# Patient Record
Sex: MALE | Race: WHITE | NOT HISPANIC OR LATINO
[De-identification: names, ages, dates, MRNs, and addresses within clinical notes are randomized per-mention and may not be internally consistent; named-entity substitution may affect disease eponyms.]

---

## 2020-09-11 PROBLEM — Z00.00 ENCOUNTER FOR PREVENTIVE HEALTH EXAMINATION: Status: ACTIVE | Noted: 2020-09-11

## 2020-09-15 ENCOUNTER — APPOINTMENT (OUTPATIENT)
Dept: ORTHOPEDIC SURGERY | Facility: CLINIC | Age: 40
End: 2020-09-15
Payer: MEDICAID

## 2020-09-15 VITALS — HEIGHT: 70 IN | WEIGHT: 232 LBS | BODY MASS INDEX: 33.21 KG/M2

## 2020-09-15 DIAGNOSIS — Z86.39 PERSONAL HISTORY OF OTHER ENDOCRINE, NUTRITIONAL AND METABOLIC DISEASE: ICD-10-CM

## 2020-09-15 PROCEDURE — 99204 OFFICE O/P NEW MOD 45 MIN: CPT

## 2020-09-24 ENCOUNTER — APPOINTMENT (OUTPATIENT)
Dept: MRI IMAGING | Facility: CLINIC | Age: 40
End: 2020-09-24

## 2020-09-24 ENCOUNTER — RESULT REVIEW (OUTPATIENT)
Age: 40
End: 2020-09-24

## 2020-09-29 ENCOUNTER — APPOINTMENT (OUTPATIENT)
Dept: ORTHOPEDIC SURGERY | Facility: CLINIC | Age: 40
End: 2020-09-29
Payer: MEDICAID

## 2020-09-29 DIAGNOSIS — M25.311 OTHER INSTABILITY, RIGHT SHOULDER: ICD-10-CM

## 2020-09-29 PROCEDURE — 99212 OFFICE O/P EST SF 10 MIN: CPT

## 2020-09-29 RX ORDER — ATORVASTATIN CALCIUM 80 MG/1
TABLET, FILM COATED ORAL
Refills: 0 | Status: ACTIVE | COMMUNITY

## 2020-10-30 ENCOUNTER — APPOINTMENT (OUTPATIENT)
Dept: ORTHOPEDIC SURGERY | Facility: CLINIC | Age: 40
End: 2020-10-30

## 2024-03-06 ENCOUNTER — APPOINTMENT (OUTPATIENT)
Dept: BARIATRICS | Facility: CLINIC | Age: 44
End: 2024-03-06
Payer: MEDICAID

## 2024-03-06 VITALS
SYSTOLIC BLOOD PRESSURE: 136 MMHG | WEIGHT: 252.44 LBS | HEIGHT: 70 IN | TEMPERATURE: 97.2 F | HEART RATE: 77 BPM | OXYGEN SATURATION: 95 % | DIASTOLIC BLOOD PRESSURE: 82 MMHG | BODY MASS INDEX: 36.14 KG/M2

## 2024-03-06 DIAGNOSIS — E78.1 PURE HYPERGLYCERIDEMIA: ICD-10-CM

## 2024-03-06 DIAGNOSIS — G47.33 OBSTRUCTIVE SLEEP APNEA (ADULT) (PEDIATRIC): ICD-10-CM

## 2024-03-06 DIAGNOSIS — E78.9 DISORDER OF LIPOPROTEIN METABOLISM, UNSPECIFIED: ICD-10-CM

## 2024-03-06 DIAGNOSIS — E78.00 PURE HYPERCHOLESTEROLEMIA, UNSPECIFIED: ICD-10-CM

## 2024-03-06 DIAGNOSIS — G47.30 SLEEP APNEA, UNSPECIFIED: ICD-10-CM

## 2024-03-06 DIAGNOSIS — Z82.49 FAMILY HISTORY OF ISCHEMIC HEART DISEASE AND OTHER DISEASES OF THE CIRCULATORY SYSTEM: ICD-10-CM

## 2024-03-06 DIAGNOSIS — K62.5 HEMORRHAGE OF ANUS AND RECTUM: ICD-10-CM

## 2024-03-06 DIAGNOSIS — I10 ESSENTIAL (PRIMARY) HYPERTENSION: ICD-10-CM

## 2024-03-06 DIAGNOSIS — Z80.0 FAMILY HISTORY OF MALIGNANT NEOPLASM OF DIGESTIVE ORGANS: ICD-10-CM

## 2024-03-06 PROCEDURE — 99204 OFFICE O/P NEW MOD 45 MIN: CPT

## 2024-03-06 RX ORDER — FENOFIBRATE 145 MG/1
145 TABLET, COATED ORAL
Refills: 0 | Status: ACTIVE | COMMUNITY

## 2024-03-06 RX ORDER — SERTRALINE HYDROCHLORIDE 100 MG/1
100 TABLET, FILM COATED ORAL
Refills: 0 | Status: ACTIVE | COMMUNITY

## 2024-03-06 NOTE — HISTORY OF PRESENT ILLNESS
[de-identified] : Artemio Schmidt is a 42 y/o M with sleep apnea, chronic cough, HLD, probable insulin resistance. Educated and has been unable to lose weight with multiple attempts and frustrated by this. On lipitor and currently with a BMI 44. Discussed in great detail that he needs to be active after surgery and eat healthy. He has a supportive family as well. On anti anxiolytics with no psych contraindications. Will take Pepcid QHS due to nighttime cough. Get EGD and labs for probable VSG unless considerable pathology on EGD.

## 2024-03-06 NOTE — ASSESSMENT
[FreeTextEntry1] : Artemio Schmidt is a 44 y/o M with sleep apnea, chronic cough, HLD, probable insulin resistance. Educated and has been unable to lose weight with multiple attempts and frustrated by this. On lipitor and currently with a BMI 44. Discussed in great detail that he needs to be active after surgery and eat healthy. He has a supportive family as well. On anti anxiolytics with no psych contraindications. Will take Pepcid QHS due to nighttime cough. Get EGD and labs for probable VSG unless considerable pathology on EGD.

## 2024-03-06 NOTE — ADDENDUM
[FreeTextEntry1] :  Documented by Toya Gillette acting as a scribe for ADAM Adams  on 03/06/2024  .

## 2024-03-06 NOTE — PLAN
[FreeTextEntry1] :  Will take Pepcid QHS due to nighttime cough. Get EGD and labs for probable VSG unless considerable pathology on EGD.

## 2024-03-06 NOTE — END OF VISIT
[Time Spent: ___ minutes] : I have spent [unfilled] minutes of time on the encounter. [FreeTextEntry3] :  All medical record entries made by the Scribe were at my, ADAM Aragon , direction and personally dictated by me on 03/06/2024 . I have reviewed the chart and agree that the record accurately reflects my personal performance of the history, physical exam, assessment and plan. I have also personally directed, reviewed, and agreed with the chart.

## 2024-03-06 NOTE — REVIEW OF SYSTEMS
I called mom and left a message to call back to schedule procedure with Dr. Bravo.    [Negative] : Allergic/Immunologic

## 2024-03-11 ENCOUNTER — APPOINTMENT (OUTPATIENT)
Dept: BARIATRICS | Facility: CLINIC | Age: 44
End: 2024-03-11

## 2024-03-12 ENCOUNTER — NON-APPOINTMENT (OUTPATIENT)
Age: 44
End: 2024-03-12

## 2024-03-12 VITALS — BODY MASS INDEX: 36.65 KG/M2 | WEIGHT: 256 LBS | HEIGHT: 70 IN

## 2024-04-10 ENCOUNTER — NON-APPOINTMENT (OUTPATIENT)
Age: 44
End: 2024-04-10

## 2024-04-10 VITALS — HEIGHT: 70 IN | BODY MASS INDEX: 36.36 KG/M2 | WEIGHT: 254 LBS

## 2024-06-26 ENCOUNTER — APPOINTMENT (OUTPATIENT)
Dept: BARIATRICS | Facility: CLINIC | Age: 44
End: 2024-06-26
Payer: MEDICAID

## 2024-06-26 VITALS — BODY MASS INDEX: 36.79 KG/M2 | HEIGHT: 70 IN | WEIGHT: 257 LBS

## 2024-06-26 DIAGNOSIS — E66.01 MORBID (SEVERE) OBESITY DUE TO EXCESS CALORIES: ICD-10-CM

## 2024-06-26 PROCEDURE — 99214 OFFICE O/P EST MOD 30 MIN: CPT

## 2024-07-02 ENCOUNTER — APPOINTMENT (OUTPATIENT)
Dept: BARIATRICS | Facility: CLINIC | Age: 44
End: 2024-07-02

## 2024-07-03 ENCOUNTER — APPOINTMENT (OUTPATIENT)
Dept: BARIATRICS | Facility: CLINIC | Age: 44
End: 2024-07-03
Payer: MEDICAID

## 2024-07-03 DIAGNOSIS — E66.01 MORBID (SEVERE) OBESITY DUE TO EXCESS CALORIES: ICD-10-CM

## 2024-07-03 PROCEDURE — 99213 OFFICE O/P EST LOW 20 MIN: CPT

## 2024-07-05 NOTE — PATIENT PROFILE ADULT - STATED REASON FOR ADMISSION
From: Manuel Han  To: Tyler Payan  Sent: 11/22/2021 1:58 PM CST  Subject: Refills     Hi     I know I see Young Randall on a few weeks but my meds will probably run out beforebi see her. The pharmacy called to say that refills for Jardiance and Winsome Orn were not approved? I also put a request in for Fiasp and trulicity as well. Let me knkw what you need from me.  Sara to meet her Dec 20 Laparoscopic sleeve

## 2024-07-05 NOTE — PATIENT PROFILE ADULT - FALL HARM RISK - RISK INTERVENTIONS

## 2024-07-07 ENCOUNTER — TRANSCRIPTION ENCOUNTER (OUTPATIENT)
Age: 44
End: 2024-07-07

## 2024-07-08 ENCOUNTER — INPATIENT (INPATIENT)
Facility: HOSPITAL | Age: 44
LOS: 0 days | Discharge: HOME | DRG: 621 | End: 2024-07-09
Attending: SURGERY | Admitting: SURGERY
Payer: MEDICAID

## 2024-07-08 ENCOUNTER — APPOINTMENT (OUTPATIENT)
Dept: BARIATRICS | Facility: HOSPITAL | Age: 44
End: 2024-07-08

## 2024-07-08 ENCOUNTER — RESULT REVIEW (OUTPATIENT)
Age: 44
End: 2024-07-08

## 2024-07-08 VITALS
OXYGEN SATURATION: 95 % | TEMPERATURE: 98 F | RESPIRATION RATE: 16 BRPM | SYSTOLIC BLOOD PRESSURE: 132 MMHG | DIASTOLIC BLOOD PRESSURE: 91 MMHG | WEIGHT: 255.96 LBS | HEIGHT: 70 IN | HEART RATE: 84 BPM

## 2024-07-08 DIAGNOSIS — Z98.890 OTHER SPECIFIED POSTPROCEDURAL STATES: Chronic | ICD-10-CM

## 2024-07-08 LAB
HCT VFR BLD CALC: 45.6 % — SIGNIFICANT CHANGE UP (ref 39–50)
HGB BLD-MCNC: 15.7 G/DL — SIGNIFICANT CHANGE UP (ref 13–17)
MCHC RBC-ENTMCNC: 30.1 PG — SIGNIFICANT CHANGE UP (ref 27–34)
MCHC RBC-ENTMCNC: 34.4 GM/DL — SIGNIFICANT CHANGE UP (ref 32–36)
MCV RBC AUTO: 87.5 FL — SIGNIFICANT CHANGE UP (ref 80–100)
NRBC # BLD: 0 /100 WBCS — SIGNIFICANT CHANGE UP (ref 0–0)
PLATELET # BLD AUTO: 234 K/UL — SIGNIFICANT CHANGE UP (ref 150–400)
RBC # BLD: 5.21 M/UL — SIGNIFICANT CHANGE UP (ref 4.2–5.8)
RBC # FLD: 11.9 % — SIGNIFICANT CHANGE UP (ref 10.3–14.5)
WBC # BLD: 10.98 K/UL — HIGH (ref 3.8–10.5)
WBC # FLD AUTO: 10.98 K/UL — HIGH (ref 3.8–10.5)

## 2024-07-08 PROCEDURE — 88307 TISSUE EXAM BY PATHOLOGIST: CPT | Mod: 26

## 2024-07-08 PROCEDURE — 43775 LAP SLEEVE GASTRECTOMY: CPT

## 2024-07-08 PROCEDURE — 43332 TRANSAB ESOPH HIAT HERN RPR: CPT

## 2024-07-08 DEVICE — STAPLER ETHICON GST ECHELON 60MM GREEN RELOAD: Type: IMPLANTABLE DEVICE | Status: FUNCTIONAL

## 2024-07-08 DEVICE — STAPLER ETHICON ECHELON ENDOPATH 60MM: Type: IMPLANTABLE DEVICE | Status: FUNCTIONAL

## 2024-07-08 DEVICE — STAPLER ETHICON GST ECHELON 60MM BLUE RELOAD: Type: IMPLANTABLE DEVICE | Status: FUNCTIONAL

## 2024-07-08 DEVICE — STAPLER ETHICON ECHELON ENDOPATH GRIP SURFACE 60MM WHITE RELOAD: Type: IMPLANTABLE DEVICE | Status: FUNCTIONAL

## 2024-07-08 RX ORDER — THIAMINE HCL 100 MG
500 TABLET ORAL DAILY
Refills: 0 | Status: DISCONTINUED | OUTPATIENT
Start: 2024-07-08 | End: 2024-07-09

## 2024-07-08 RX ORDER — ATORVASTATIN CALCIUM 20 MG/1
1 TABLET, FILM COATED ORAL
Refills: 0 | DISCHARGE

## 2024-07-08 RX ORDER — KETOROLAC TROMETHAMINE 30 MG/ML
15 INJECTION, SOLUTION INTRAMUSCULAR EVERY 6 HOURS
Refills: 0 | Status: DISCONTINUED | OUTPATIENT
Start: 2024-07-08 | End: 2024-07-09

## 2024-07-08 RX ORDER — FENOFIBRATE 130 MG/1
1 CAPSULE ORAL
Refills: 0 | DISCHARGE

## 2024-07-08 RX ORDER — ACETAMINOPHEN 325 MG
1000 TABLET ORAL ONCE
Refills: 0 | Status: COMPLETED | OUTPATIENT
Start: 2024-07-08 | End: 2024-07-08

## 2024-07-08 RX ORDER — DEXTROSE MONOHYDRATE AND SODIUM CHLORIDE 5; .3 G/100ML; G/100ML
1000 INJECTION, SOLUTION INTRAVENOUS
Refills: 0 | Status: DISCONTINUED | OUTPATIENT
Start: 2024-07-08 | End: 2024-07-09

## 2024-07-08 RX ORDER — ENOXAPARIN SODIUM 100 MG/ML
30 INJECTION SUBCUTANEOUS ONCE
Refills: 0 | Status: COMPLETED | OUTPATIENT
Start: 2024-07-08 | End: 2024-07-08

## 2024-07-08 RX ORDER — ONDANSETRON HYDROCHLORIDE 2 MG/ML
4 INJECTION INTRAMUSCULAR; INTRAVENOUS EVERY 6 HOURS
Refills: 0 | Status: DISCONTINUED | OUTPATIENT
Start: 2024-07-08 | End: 2024-07-09

## 2024-07-08 RX ORDER — PROPRANOLOL HCL 80 MG
1 TABLET ORAL
Refills: 0 | DISCHARGE

## 2024-07-08 RX ORDER — ACETAMINOPHEN 325 MG
1000 TABLET ORAL EVERY 6 HOURS
Refills: 0 | Status: DISCONTINUED | OUTPATIENT
Start: 2024-07-08 | End: 2024-07-08

## 2024-07-08 RX ORDER — ACETAMINOPHEN 325 MG
1000 TABLET ORAL ONCE
Refills: 0 | Status: DISCONTINUED | OUTPATIENT
Start: 2024-07-08 | End: 2024-07-09

## 2024-07-08 RX ORDER — SCOPOLAMINE 1.5 MG/1
1 PATCH, EXTENDED RELEASE TRANSDERMAL ONCE
Refills: 0 | Status: COMPLETED | OUTPATIENT
Start: 2024-07-08 | End: 2024-07-08

## 2024-07-08 RX ORDER — HYDROMORPHONE HCL 0.2 MG/ML
0.5 INJECTION, SOLUTION INTRAVENOUS
Refills: 0 | Status: DISCONTINUED | OUTPATIENT
Start: 2024-07-08 | End: 2024-07-08

## 2024-07-08 RX ORDER — PANTOPRAZOLE SODIUM 40 MG/10ML
40 INJECTION, POWDER, FOR SOLUTION INTRAVENOUS DAILY
Refills: 0 | Status: DISCONTINUED | OUTPATIENT
Start: 2024-07-08 | End: 2024-07-09

## 2024-07-08 RX ADMIN — Medication 105 MILLIGRAM(S): at 11:29

## 2024-07-08 RX ADMIN — DEXTROSE MONOHYDRATE AND SODIUM CHLORIDE 150 MILLILITER(S): 5; .3 INJECTION, SOLUTION INTRAVENOUS at 10:38

## 2024-07-08 RX ADMIN — ENOXAPARIN SODIUM 30 MILLIGRAM(S): 100 INJECTION SUBCUTANEOUS at 07:09

## 2024-07-08 RX ADMIN — PANTOPRAZOLE SODIUM 40 MILLIGRAM(S): 40 INJECTION, POWDER, FOR SOLUTION INTRAVENOUS at 12:12

## 2024-07-08 RX ADMIN — SCOPOLAMINE 1 PATCH: 1.5 PATCH, EXTENDED RELEASE TRANSDERMAL at 07:09

## 2024-07-08 RX ADMIN — Medication 400 MILLIGRAM(S): at 14:20

## 2024-07-08 RX ADMIN — Medication 1000 MILLIGRAM(S): at 07:10

## 2024-07-08 RX ADMIN — KETOROLAC TROMETHAMINE 15 MILLIGRAM(S): 30 INJECTION, SOLUTION INTRAMUSCULAR at 22:29

## 2024-07-08 NOTE — PROGRESS NOTE ADULT - SUBJECTIVE AND OBJECTIVE BOX
Procedure: s/p laparoscopic sleeve gastrectomy with HHR   Surgeon: Dr. Gongora    S: Pt seen and examined at bedside. Patient is lying comfortably in bed with no complaints. Tolerating diet, pain well controlled with current regimen. Patient denies fever, nausea, vomiting, chest pain, and shortness of breath. Patient is not yet passing gas or having bowel movements. Patient has not voided yet.     O:  T(C): 36.3 (07-08-24 @ 12:32), Max: 36.4 (07-08-24 @ 10:14)  T(F): 97.3 (07-08-24 @ 12:32), Max: 97.5 (07-08-24 @ 10:14)  HR: 90 (07-08-24 @ 12:32) (87 - 95)  BP: 121/71 (07-08-24 @ 12:32) (110/68 - 125/69)  RR: 20 (07-08-24 @ 12:32) (17 - 25)  SpO2: 96% (07-08-24 @ 12:32) (88% - 96%)  Wt(kg): --            Physical Exam  General: Patient is doing well and lying in bed comfortably  Constitutional: alert and oriented   Pulm: Nonlabored breathing, no respiratory distress  CV: Regular rate and rhythm, normal sinus rhythm  Abd:  soft, TTP in LUQ and in epigastric area. Mildly distended. No rebound or guarding.             Incisions: 4 port sites with dermabond, dry, intact and healing well, no erythema/induration/edema   Extremities: warm, well perfused, no edema  Drains:     A/P: 43y Male s/p   Diet/IVF:  Pain/nausea control prn  DVT ppx:  Dispo plan:   Procedure: s/p laparoscopic sleeve gastrectomy with HHR   Surgeon: Dr. Gongora    S: Pt seen and examined at bedside. Patient is lying comfortably in bed with no complaints. Tolerating diet, pain well controlled with current regimen. Patient denies fever, nausea, vomiting, chest pain, and shortness of breath. Patient is not yet passing gas or having bowel movements. Patient has not voided yet.     O:  T(C): 36.3 (07-08-24 @ 12:32), Max: 36.4 (07-08-24 @ 10:14)  T(F): 97.3 (07-08-24 @ 12:32), Max: 97.5 (07-08-24 @ 10:14)  HR: 90 (07-08-24 @ 12:32) (87 - 95)  BP: 121/71 (07-08-24 @ 12:32) (110/68 - 125/69)  RR: 20 (07-08-24 @ 12:32) (17 - 25)  SpO2: 96% (07-08-24 @ 12:32) (88% - 96%)  Wt(kg): --            Physical Exam  General: Patient is doing well and lying in bed comfortably  Constitutional: alert and oriented   Pulm: Nonlabored breathing, no respiratory distress  CV: Regular rate and rhythm, normal sinus rhythm  Abd:  soft, TTP in LUQ and in epigastric area. Mildly distended. No rebound or guarding.             Incisions: 4 port sites with dermabond, clean, dry, intact and healing well, no erythema/induration/edema   Extremities: warm, well perfused, no edema    A/P: 43y Male s/p laparoscopic sleeve gastrectomy with HHR  BCLD  Pain/nausea control prn  CBC at 4pm  SCDs/OOBA/IS  Protonix  Nutrition consult in AM  AM Labs Procedure: s/p laparoscopic sleeve gastrectomy with HHR   Surgeon: Dr. Gongora    S: Pt seen and examined at bedside. Patient is lying comfortably in bed with no complaints. Tolerating diet, pain well controlled with current regimen. Patient denies fever, nausea, vomiting, chest pain, and shortness of breath. Patient is not yet passing gas or having bowel movements. Patient has not voided yet.     O:  T(C): 36.3 (07-08-24 @ 12:32), Max: 36.4 (07-08-24 @ 10:14)  T(F): 97.3 (07-08-24 @ 12:32), Max: 97.5 (07-08-24 @ 10:14)  HR: 90 (07-08-24 @ 12:32) (87 - 95)  BP: 121/71 (07-08-24 @ 12:32) (110/68 - 125/69)  RR: 20 (07-08-24 @ 12:32) (17 - 25)  SpO2: 96% (07-08-24 @ 12:32) (88% - 96%)  Wt(kg): --            Physical Exam  General: Patient is doing well and lying in bed comfortably  Constitutional: alert and oriented   Pulm: Nonlabored breathing, no respiratory distress  CV: Regular rate and rhythm, normal sinus rhythm  Abd:  soft, TTP in LUQ and in epigastric area, Mildly distended. No rebound or guarding.             Incisions: 4 port sites with dermabond, clean, dry, intact and healing well, no erythema/induration/edema   Extremities: warm, well perfused, no edema     Procedure: s/p laparoscopic sleeve gastrectomy with HHR   Surgeon: Dr. Gongora    S: Pt seen and examined at bedside. Patient is lying comfortably in bed with no complaints. Tolerating diet, pain well controlled with current regimen. Patient denies fever, nausea, vomiting, chest pain, and shortness of breath. Patient is not yet passing gas or having bowel movements. Patient has not voided yet.     O:  T(C): 36.3 (07-08-24 @ 12:32), Max: 36.4 (07-08-24 @ 10:14)  T(F): 97.3 (07-08-24 @ 12:32), Max: 97.5 (07-08-24 @ 10:14)  HR: 90 (07-08-24 @ 12:32) (87 - 95)  BP: 121/71 (07-08-24 @ 12:32) (110/68 - 125/69)  RR: 20 (07-08-24 @ 12:32) (17 - 25)  SpO2: 96% (07-08-24 @ 12:32) (88% - 96%)  Wt(kg): --            Physical Exam  General: Patient is doing well and lying in bed comfortably  Constitutional: alert and oriented   Pulm: Nonlabored breathing, no respiratory distress  CV: Regular rate and rhythm, normal sinus rhythm  Abd:  soft, TTP in LUQ and in epigastric area, Mildly distended. No rebound or guarding.             Incisions: 5 port sites with dermabond, clean, dry, intact and healing well, no erythema/induration/edema   Extremities: warm, well perfused, no edema

## 2024-07-08 NOTE — H&P ADULT - NSICDXPASTMEDICALHX_GEN_ALL_CORE_FT
PAST MEDICAL HISTORY:  Anxiety     Dyslipidemia     GERD (gastroesophageal reflux disease)     GUILLERMO (obstructive sleep apnea) occasional CPAP

## 2024-07-08 NOTE — BRIEF OPERATIVE NOTE - OPERATION/FINDINGS
Optiview entry at in LUQ, mid clavicular with 12mm trocar, additional 15mm trocar placed under direct visualization at umbilicus, 2 additional 5mm ports placed LUQ, RUQ, and liver retractor placed subxiphoid. Liver retracted superiorly, Ligasure used to enter lesser sac at level of angularis, epiploic branches divided, and omentum  from greater curvature. Stomach divided along 38Fr Bougie edge using laparoscopic stapler, green load with buttress x 1, blue load with buttress x 5, white load x 1. Small hiatal hernia noted and and right and left vanesa approximated with Quill, with larger bites on left. Hemostasis achieved. Specimen removed via 15mm port site, fascia then closed w 0-vicryl, skin closed w 4-0 monocryl and dermabond.

## 2024-07-08 NOTE — PROGRESS NOTE ADULT - ASSESSMENT
A/P: The patient is a 43 year old male with a PMHx of HLD, anxiety, GUILLERMO (not on CPAP at home), MO (BMI 36 from 44) now s/p laparoscopic sleeve gastrectomy with HHR on 7/8.     Plan  BCLD  Pain/nausea control prn  CBC at 4pm  SCDs/OOBA/IS  Protonix  Nutrition consult in AM  AM Labs

## 2024-07-08 NOTE — H&P ADULT - ASSESSMENT
The patient is a 43 year old male with a PMHx of HLD, anxiety, GUILLERMO (not on CPAP at home), MO presenting for laparoscopic sleeve gastrectomy and possible hiatal hernia repair.     Plan  Proceed to OR

## 2024-07-08 NOTE — H&P ADULT - HISTORY OF PRESENT ILLNESS
The patient is a 44 y/o M with sleep apnea, chronic cough, HLD, probable insulin resistance, MO w/ BMI 44 presenting for sleeve gastrectomy and possible hiatal hernia repair.   Preoperative workup complete, with Hgb 15.9, Cr 0.9.

## 2024-07-08 NOTE — H&P ADULT - NSHPPHYSICALEXAM_GEN_ALL_CORE
General: Patient is doing well and lying in bed comfortably  Constitutional: alert and oriented   Pulm: Nonlabored breathing, no respiratory distress  CV: Regular rate and rhythm, normal sinus rhythm  Abd:  soft, nontender, nondistended. No rebound, no guarding.   Extremities: warm, well perfused, no edema

## 2024-07-09 ENCOUNTER — APPOINTMENT (OUTPATIENT)
Dept: BARIATRICS | Facility: CLINIC | Age: 44
End: 2024-07-09

## 2024-07-09 ENCOUNTER — TRANSCRIPTION ENCOUNTER (OUTPATIENT)
Age: 44
End: 2024-07-09

## 2024-07-09 VITALS
DIASTOLIC BLOOD PRESSURE: 94 MMHG | RESPIRATION RATE: 20 BRPM | HEART RATE: 98 BPM | SYSTOLIC BLOOD PRESSURE: 154 MMHG | OXYGEN SATURATION: 94 % | TEMPERATURE: 98 F

## 2024-07-09 LAB
ANION GAP SERPL CALC-SCNC: 12 MMOL/L — SIGNIFICANT CHANGE UP (ref 5–17)
BUN SERPL-MCNC: 17 MG/DL — SIGNIFICANT CHANGE UP (ref 7–23)
CALCIUM SERPL-MCNC: 9.6 MG/DL — SIGNIFICANT CHANGE UP (ref 8.4–10.5)
CHLORIDE SERPL-SCNC: 104 MMOL/L — SIGNIFICANT CHANGE UP (ref 96–108)
CO2 SERPL-SCNC: 22 MMOL/L — SIGNIFICANT CHANGE UP (ref 22–31)
CREAT SERPL-MCNC: 0.96 MG/DL — SIGNIFICANT CHANGE UP (ref 0.5–1.3)
EGFR: 100 ML/MIN/1.73M2 — SIGNIFICANT CHANGE UP
GLUCOSE SERPL-MCNC: 126 MG/DL — HIGH (ref 70–99)
HCT VFR BLD CALC: 47 % — SIGNIFICANT CHANGE UP (ref 39–50)
HGB BLD-MCNC: 15.8 G/DL — SIGNIFICANT CHANGE UP (ref 13–17)
MAGNESIUM SERPL-MCNC: 2.3 MG/DL — SIGNIFICANT CHANGE UP (ref 1.6–2.6)
MCHC RBC-ENTMCNC: 29.7 PG — SIGNIFICANT CHANGE UP (ref 27–34)
MCHC RBC-ENTMCNC: 33.6 GM/DL — SIGNIFICANT CHANGE UP (ref 32–36)
MCV RBC AUTO: 88.3 FL — SIGNIFICANT CHANGE UP (ref 80–100)
NRBC # BLD: 0 /100 WBCS — SIGNIFICANT CHANGE UP (ref 0–0)
PHOSPHATE SERPL-MCNC: 2.7 MG/DL — SIGNIFICANT CHANGE UP (ref 2.5–4.5)
PLATELET # BLD AUTO: 282 K/UL — SIGNIFICANT CHANGE UP (ref 150–400)
POTASSIUM SERPL-MCNC: 4.2 MMOL/L — SIGNIFICANT CHANGE UP (ref 3.5–5.3)
POTASSIUM SERPL-SCNC: 4.2 MMOL/L — SIGNIFICANT CHANGE UP (ref 3.5–5.3)
RBC # BLD: 5.32 M/UL — SIGNIFICANT CHANGE UP (ref 4.2–5.8)
RBC # FLD: 11.9 % — SIGNIFICANT CHANGE UP (ref 10.3–14.5)
SODIUM SERPL-SCNC: 138 MMOL/L — SIGNIFICANT CHANGE UP (ref 135–145)
WBC # BLD: 12.31 K/UL — HIGH (ref 3.8–10.5)
WBC # FLD AUTO: 12.31 K/UL — HIGH (ref 3.8–10.5)

## 2024-07-09 PROCEDURE — 85027 COMPLETE CBC AUTOMATED: CPT

## 2024-07-09 PROCEDURE — C1889: CPT

## 2024-07-09 PROCEDURE — 80048 BASIC METABOLIC PNL TOTAL CA: CPT

## 2024-07-09 PROCEDURE — 83735 ASSAY OF MAGNESIUM: CPT

## 2024-07-09 PROCEDURE — 36415 COLL VENOUS BLD VENIPUNCTURE: CPT

## 2024-07-09 PROCEDURE — 94660 CPAP INITIATION&MGMT: CPT

## 2024-07-09 PROCEDURE — 84100 ASSAY OF PHOSPHORUS: CPT

## 2024-07-09 RX ORDER — ACETAMINOPHEN 325 MG
1000 TABLET ORAL ONCE
Refills: 0 | Status: COMPLETED | OUTPATIENT
Start: 2024-07-09 | End: 2024-07-09

## 2024-07-09 RX ORDER — OMEPRAZOLE 10 MG/1
1 CAPSULE, DELAYED RELEASE ORAL
Qty: 30 | Refills: 0
Start: 2024-07-09 | End: 2024-08-07

## 2024-07-09 RX ORDER — ACETAMINOPHEN 325 MG
20 TABLET ORAL
Qty: 320 | Refills: 0
Start: 2024-07-09 | End: 2024-07-12

## 2024-07-09 RX ORDER — OMEPRAZOLE 10 MG/1
1 CAPSULE, DELAYED RELEASE ORAL
Refills: 0 | DISCHARGE

## 2024-07-09 RX ORDER — DEXTROSE MONOHYDRATE AND SODIUM CHLORIDE 5; .3 G/100ML; G/100ML
1000 INJECTION, SOLUTION INTRAVENOUS
Refills: 0 | Status: DISCONTINUED | OUTPATIENT
Start: 2024-07-09 | End: 2024-07-09

## 2024-07-09 RX ORDER — APIXABAN 5 MG/1
1 TABLET, FILM COATED ORAL
Qty: 60 | Refills: 0
Start: 2024-07-09 | End: 2024-08-07

## 2024-07-09 RX ORDER — SERTRALINE HYDROCHLORIDE 100 MG/1
0 TABLET, FILM COATED ORAL
Qty: 0 | Refills: 0 | DISCHARGE

## 2024-07-09 RX ADMIN — KETOROLAC TROMETHAMINE 15 MILLIGRAM(S): 30 INJECTION, SOLUTION INTRAMUSCULAR at 10:51

## 2024-07-09 RX ADMIN — KETOROLAC TROMETHAMINE 15 MILLIGRAM(S): 30 INJECTION, SOLUTION INTRAMUSCULAR at 10:36

## 2024-07-09 RX ADMIN — Medication 1000 MILLIGRAM(S): at 10:09

## 2024-07-09 RX ADMIN — PANTOPRAZOLE SODIUM 40 MILLIGRAM(S): 40 INJECTION, POWDER, FOR SOLUTION INTRAVENOUS at 09:54

## 2024-07-09 RX ADMIN — DEXTROSE MONOHYDRATE AND SODIUM CHLORIDE 75 MILLILITER(S): 5; .3 INJECTION, SOLUTION INTRAVENOUS at 10:01

## 2024-07-09 RX ADMIN — Medication 400 MILLIGRAM(S): at 09:54

## 2024-07-09 RX ADMIN — Medication 105 MILLIGRAM(S): at 10:50

## 2024-07-09 RX ADMIN — Medication 62.5 MILLIMOLE(S): at 10:44

## 2024-07-09 RX ADMIN — SCOPOLAMINE 1 PATCH: 1.5 PATCH, EXTENDED RELEASE TRANSDERMAL at 07:30

## 2024-07-09 RX ADMIN — KETOROLAC TROMETHAMINE 15 MILLIGRAM(S): 30 INJECTION, SOLUTION INTRAMUSCULAR at 04:34

## 2024-07-09 NOTE — DIETITIAN INITIAL EVALUATION ADULT - REASON FOR ADMISSION
G47.30  "The patient is a 43 year old male with a PMHx of HLD, anxiety, GUILLERMO (not on CPAP at home), MO (BMI 36 from 44) now s/p laparoscopic sleeve gastrectomy with HHR on 7/8."

## 2024-07-09 NOTE — DIETITIAN INITIAL EVALUATION ADULT - PERTINENT MEDS FT
MEDICATIONS  (STANDING):  ketorolac   Injectable 15 milliGRAM(s) IV Push every 6 hours  lactated ringers. 1000 milliLiter(s) (75 mL/Hr) IV Continuous <Continuous>  pantoprazole  Injectable 40 milliGRAM(s) IV Push daily  thiamine IVPB 500 milliGRAM(s) IV Intermittent daily    MEDICATIONS  (PRN):  ondansetron Injectable 4 milliGRAM(s) IV Push every 6 hours PRN Nausea

## 2024-07-09 NOTE — DIETITIAN INITIAL EVALUATION ADULT - PERTINENT LABORATORY DATA
07-09    138  |  104  |  17  ----------------------------<  126<H>  4.2   |  22  |  0.96    Ca    9.6      09 Jul 2024 05:30  Phos  2.7     07-09  Mg     2.3     07-09

## 2024-07-09 NOTE — DIETITIAN INITIAL EVALUATION ADULT - EDUCATION DIETARY MODIFICATIONS
RD Discussed volumes of various cup sizes on tray table and encouraged aiming for 4 oz/hr as tolerated. Pt is prepared with protein shakes, with plan to get vitamins after discharge. RD provided indepth edu on diet advancement and specific nutrient needs s/p LSG. Pt appears receptive, verbalized understanding. Written nutrition education handouts provided./(2) meets goals/outcomes/verbalization

## 2024-07-09 NOTE — DIETITIAN INITIAL EVALUATION ADULT - OTHER INFO
Pt seen on 9WO at bedside. On assessment, pt resting in bed. Currently on BARICLLIQ diet, tolerating PO. Pt had sips of water out of the clear, 30cc cups. Pain and nausea well controlled. Discussed volumes of various cup sizes on tray table and encouraged aiming for 4 oz/hr as tolerated. Prepared with protein shakes, with plan to get vitamins after discharge. RD provided indepth edu on diet advancement and specific nutrient needs s/p LSG. Confirms No known food allergies. States that he follows a Kosher diet at home. Skin: surgical incisions. Narinder Score: 19 GI: WDL per flowsheet. Labs reviewed:WNL ; will monitor trends. Pt's wt on admission was 255 pounds , pt's IBW is 166 pounds ; IBW to be utilized for nutrient calculations. RD observed pt with no overt signs of muscle or fat wasting. Based on ASPEN guidelines, pt does not meet criteria for malnutrition at this time. RD to follow up per protocol.

## 2024-07-09 NOTE — DISCHARGE NOTE PROVIDER - HOSPITAL COURSE
43 year old male with past medical history hyperlipidemia,  anxiety, GUILLERMO (not on CPAP at home), obesity (BMI 36),  now s/p laparoscopic sleeve gastrectomy with hiatal hernia repair on 7/8. Pt tolerated the procedure well. At time of discharge, pt was tolerating a bariatric clear liquid diet, and pt's pain was controlled. Plan is to follow up with Dr. Gongora in the office.

## 2024-07-09 NOTE — DISCHARGE NOTE PROVIDER - NSDCFUADDINST_GEN_ALL_CORE_FT
Follow up with Dr. Gongora in 1 week. Call the office at  to schedule your appointment. You may shower; soap and water over incision sites. Do not scrub. Pat dry when done. No tub bathing or swimming until cleared. Keep incision sites out of the sun as scars will darken. No heavy lifting (>10lbs) or strenuous exercise. Diet: Bariatric Full Fluids. 60 grams protein daily.  64 fluid ounces water daily. Drink small sips throughout the day. Continue diet as outlined by paperwork received as a pre-operative patient. You should be urinating at least 3-4x per day. Call the office if you experience increasing abdominal pain, nausea, vomiting, or temperature >100.4F.  NO ASPIRIN OR NSAIDs until approved by Dr. Gongora. Avoid alcoholic beverages until cleared by Dr. Gongora.  1) Please take Tylenol 650 mg every 4 to 6 hours by mouth for moderate pain control. Please do not exceed over 4,000 mg of Tylenol a day.  2) Please start taking Eliquis 2.5 mg by mouth twice a day starting 3 days after surgery. Please start Thursday July 11, 2024 .  3) Please take Omeprazole 40 mg once a day by mouth.

## 2024-07-09 NOTE — DISCHARGE NOTE NURSING/CASE MANAGEMENT/SOCIAL WORK - PATIENT PORTAL LINK FT
You can access the FollowMyHealth Patient Portal offered by [facility] by registering at the following website: [email or web address]. By joining [email or web address]’s FollowMyHealth portal, you will also be able to view your health information using other applications (apps) compatible with our system.

## 2024-07-09 NOTE — DISCHARGE NOTE PROVIDER - NSDCMRMEDTOKEN_GEN_ALL_CORE_FT
acetaminophen 160 mg/5 mL oral liquid: 20 milliliter(s) orally every 6 hours as needed for -for moderate to severe pain MDD: 80 mL  Eliquis 2.5 mg oral tablet: 1 tab(s) orally 2 times a day MDD:2 tablets MDD: 2 tabs  fenofibrate 145 mg oral tablet: 1 tab(s) orally  Lipitor 20 mg oral tablet: 1 tab(s) orally  omeprazole 40 mg oral delayed release capsule: 1 cap(s) orally once a day MDD: 1 cap  propranolol 40 mg oral tablet: 1 tab(s) orally  zoloft: 1 tab by mouth daily

## 2024-07-09 NOTE — PROGRESS NOTE ADULT - ASSESSMENT
The patient is a 43 year old male with a PMHx of HLD, anxiety, GUILLERMO (not on CPAP at home), MO (BMI 36 from 44) now s/p laparoscopic sleeve gastrectomy with HHR on 7/8.     BCLD  Pain/nausea control prn  CBC at 4pm  SCDs/OOBA/IS  Protonix  Nutrition consult in AM  AM Labs     The patient is a 43 year old male with a PMHx of HLD, anxiety, GUILLERMO (not on CPAP at home), MO (BMI 36 from 44) now s/p laparoscopic sleeve gastrectomy with HHR on 7/8.     BCLD  Pain/nausea control prn  CBC at 4pm  SCDs/OOBA/IS  Protonix  Nutrition consult in AM  AM Labs    Patient seen and case discussed in detail with Dr. Gongora

## 2024-07-09 NOTE — DISCHARGE NOTE PROVIDER - NSDCFUSCHEDAPPT_GEN_ALL_CORE_FT
[place] Gongora  [facility]  [facility] [address]  Scheduled Appointment: 07/24/2024    [facility]  [facility] [address]  Scheduled Appointment: 09/13/2024

## 2024-07-09 NOTE — PROGRESS NOTE ADULT - SUBJECTIVE AND OBJECTIVE BOX
INTERVAL HPI/OVERNIGHT EVENTS:  Failed TOV, BS with 500cc, straight cath put out 1L, next TOV at 6AM  7/8: s/p sleeve, HHR, on HFNC, gave 1000 mg IV tylenol     STATUS POST:  Laparoscopic revision of sleeve gastrectomy, modified duodenal switch and hiatal hernia repair     POST OPERATIVE DAY #: 1    SUBJECTIVE: Patient examined bedside with Dr. Gongora.  Patient complains of incisional pain. Patient reports that she is tolerating sips of bariatric clear liquid diet. Patient reports she is ambulating and using incentive spirometer. Patient denies SOB, chest pain, nausea and emesis . Patient reports he urinated this morning before surgical team arrived to his room.           Vital Signs Last 24 Hrs  T(C): 36.5 (09 Jul 2024 04:44), Max: 36.9 (08 Jul 2024 13:07)  T(F): 97.7 (09 Jul 2024 04:44), Max: 98.5 (08 Jul 2024 13:07)  HR: 103 (09 Jul 2024 06:44) (82 - 103)  BP: 142/81 (09 Jul 2024 04:44) (110/68 - 152/83)  BP(mean): 91 (08 Jul 2024 12:32) (84 - 94)  RR: 18 (09 Jul 2024 06:44) (17 - 25)  SpO2: 96% (09 Jul 2024 06:44) (88% - 99%)    Parameters below as of 09 Jul 2024 06:44  Patient On (Oxygen Delivery Method): room air      I&O's Detail    08 Jul 2024 07:01  -  09 Jul 2024 07:00  --------------------------------------------------------  IN:    Lactated Ringers: 3150 mL    Oral Fluid: 30 mL  Total IN: 3180 mL    OUT:    Intermittent Catheterization - Urethral (mL): 1000 mL    Voided (mL): 275 mL  Total OUT: 1275 mL    Total NET: 1905 mL          General: NAD, resting comfortably in bed  C/V: NSR  Pulm: Nonlabored breathing, no respiratory distress  Abd: soft, non distended , TTP around incision site , incision clean dry and intact   Extrem: WWP, no edema, SCDs in place        LABS:                        15.8   12.31 )-----------( 282      ( 09 Jul 2024 05:30 )             47.0     07-09    138  |  104  |  17  ----------------------------<  126<H>  4.2   |  22  |  0.96    Ca    9.6      09 Jul 2024 05:30  Phos  2.7     07-09  Mg     2.3     07-09        Urinalysis Basic - ( 09 Jul 2024 05:30 )    Color: x / Appearance: x / SG: x / pH: x  Gluc: 126 mg/dL / Ketone: x  / Bili: x / Urobili: x   Blood: x / Protein: x / Nitrite: x   Leuk Esterase: x / RBC: x / WBC x   Sq Epi: x / Non Sq Epi: x / Bacteria: x           INTERVAL HPI/OVERNIGHT EVENTS:  Failed TOV, BS with 500cc, straight cath put out 1L, next TOV at 6AM  7/8: s/p sleeve, HHR, on HFNC, gave 1000 mg IV tylenol     STATUS POST:  Laparoscopic  sleeve gastrectomy and hiatal hernia repair     POST OPERATIVE DAY #: 1    SUBJECTIVE: Patient examined bedside with Dr. Gongora.  Patient complains of incisional pain. Patient reports that he is tolerating sips of bariatric clear liquid diet. Patient reports he is ambulating and using incentive spirometer. Patient denies SOB, chest pain, nausea and emesis . Patient reports he urinated this morning before surgical team arrived to his room.           Vital Signs Last 24 Hrs  T(C): 36.5 (09 Jul 2024 04:44), Max: 36.9 (08 Jul 2024 13:07)  T(F): 97.7 (09 Jul 2024 04:44), Max: 98.5 (08 Jul 2024 13:07)  HR: 103 (09 Jul 2024 06:44) (82 - 103)  BP: 142/81 (09 Jul 2024 04:44) (110/68 - 152/83)  BP(mean): 91 (08 Jul 2024 12:32) (84 - 94)  RR: 18 (09 Jul 2024 06:44) (17 - 25)  SpO2: 96% (09 Jul 2024 06:44) (88% - 99%)    Parameters below as of 09 Jul 2024 06:44  Patient On (Oxygen Delivery Method): room air      I&O's Detail    08 Jul 2024 07:01  -  09 Jul 2024 07:00  --------------------------------------------------------  IN:    Lactated Ringers: 3150 mL    Oral Fluid: 30 mL  Total IN: 3180 mL    OUT:    Intermittent Catheterization - Urethral (mL): 1000 mL    Voided (mL): 275 mL  Total OUT: 1275 mL    Total NET: 1905 mL          General: NAD, resting comfortably in bed  C/V: NSR  Pulm: Nonlabored breathing, no respiratory distress  Abd: soft, non distended , TTP around incision site , incision clean dry and intact   Extrem: WWP, no edema, SCDs in place        LABS:                        15.8   12.31 )-----------( 282      ( 09 Jul 2024 05:30 )             47.0     07-09    138  |  104  |  17  ----------------------------<  126<H>  4.2   |  22  |  0.96    Ca    9.6      09 Jul 2024 05:30  Phos  2.7     07-09  Mg     2.3     07-09        Urinalysis Basic - ( 09 Jul 2024 05:30 )    Color: x / Appearance: x / SG: x / pH: x  Gluc: 126 mg/dL / Ketone: x  / Bili: x / Urobili: x   Blood: x / Protein: x / Nitrite: x   Leuk Esterase: x / RBC: x / WBC x   Sq Epi: x / Non Sq Epi: x / Bacteria: x

## 2024-07-09 NOTE — DIETITIAN INITIAL EVALUATION ADULT - OTHER CALCULATIONS
Above energy needs calculated for wt maintenance (20-25kcal/kg).   Weeks 1-2 estimated needs: kcal/day (10-12kcal/kg), g pro/day (1.2-1.15/kg), >/=64oz clear fluids.

## 2024-07-09 NOTE — DISCHARGE NOTE PROVIDER - CARE PROVIDER_API CALL
Washington Gongora  Surgery  [address]  [place] [address]  Phone: [phone]  Fax: [phone]  Scheduled Appointment: 07/24/2024

## 2024-07-09 NOTE — DISCHARGE NOTE PROVIDER - NSDCCPCAREPLAN_GEN_ALL_CORE_FT
PRINCIPAL DISCHARGE DIAGNOSIS  Diagnosis: Obese  Assessment and Plan of Treatment: 43 year old male with past medical history hyperlipidemia,  anxiety, GUILLERMO (not on CPAP at home), obesity (BMI 36),  now s/p laparoscopic sleeve gastrectomy with hiatal hernia repair on 7/8. Pt tolerated the procedure well. At time of discharge, pt was tolerating a bariatric clear liquid diet, and pt's pain was controlled. Plan is to follow up with Dr. Gongora in the office.  1) Please take Tylenol 650 mg every 4 to 6 hours by mouth for moderate pain control. Please do not exceed over 4,000 mg of Tylenol a day.  2) Please start taking Eliquis 2.5 mg by mouth twice a day starting 3 days after surgery. Please start Thursday July 11, 2024 .  3) Please take Omeprazole 40 mg once a day by mouth.

## 2024-07-10 ENCOUNTER — NON-APPOINTMENT (OUTPATIENT)
Age: 44
End: 2024-07-10

## 2024-07-10 LAB — SURGICAL PATHOLOGY STUDY: SIGNIFICANT CHANGE UP

## 2024-07-11 ENCOUNTER — NON-APPOINTMENT (OUTPATIENT)
Age: 44
End: 2024-07-11

## 2024-07-12 ENCOUNTER — NON-APPOINTMENT (OUTPATIENT)
Age: 44
End: 2024-07-12

## 2024-07-15 DIAGNOSIS — K21.9 GASTRO-ESOPHAGEAL REFLUX DISEASE WITHOUT ESOPHAGITIS: ICD-10-CM

## 2024-07-15 DIAGNOSIS — K76.0 FATTY (CHANGE OF) LIVER, NOT ELSEWHERE CLASSIFIED: ICD-10-CM

## 2024-07-15 DIAGNOSIS — E66.01 MORBID (SEVERE) OBESITY DUE TO EXCESS CALORIES: ICD-10-CM

## 2024-07-15 DIAGNOSIS — K44.9 DIAPHRAGMATIC HERNIA WITHOUT OBSTRUCTION OR GANGRENE: ICD-10-CM

## 2024-07-15 DIAGNOSIS — G47.33 OBSTRUCTIVE SLEEP APNEA (ADULT) (PEDIATRIC): ICD-10-CM

## 2024-07-15 DIAGNOSIS — E78.5 HYPERLIPIDEMIA, UNSPECIFIED: ICD-10-CM

## 2024-07-15 DIAGNOSIS — R16.0 HEPATOMEGALY, NOT ELSEWHERE CLASSIFIED: ICD-10-CM

## 2024-07-15 DIAGNOSIS — F41.9 ANXIETY DISORDER, UNSPECIFIED: ICD-10-CM

## 2024-07-16 ENCOUNTER — NON-APPOINTMENT (OUTPATIENT)
Age: 44
End: 2024-07-16

## 2024-07-16 ENCOUNTER — TRANSCRIPTION ENCOUNTER (OUTPATIENT)
Age: 44
End: 2024-07-16

## 2024-07-24 ENCOUNTER — APPOINTMENT (OUTPATIENT)
Dept: BARIATRICS | Facility: CLINIC | Age: 44
End: 2024-07-24
Payer: MEDICAID

## 2024-07-24 VITALS
SYSTOLIC BLOOD PRESSURE: 114 MMHG | TEMPERATURE: 97.6 F | HEART RATE: 87 BPM | BODY MASS INDEX: 33.93 KG/M2 | OXYGEN SATURATION: 97 % | WEIGHT: 237 LBS | DIASTOLIC BLOOD PRESSURE: 76 MMHG | HEIGHT: 70 IN

## 2024-07-24 DIAGNOSIS — I10 ESSENTIAL (PRIMARY) HYPERTENSION: ICD-10-CM

## 2024-07-24 DIAGNOSIS — Z98.84 BARIATRIC SURGERY STATUS: ICD-10-CM

## 2024-07-24 DIAGNOSIS — Z80.0 FAMILY HISTORY OF MALIGNANT NEOPLASM OF DIGESTIVE ORGANS: ICD-10-CM

## 2024-07-24 DIAGNOSIS — E66.01 MORBID (SEVERE) OBESITY DUE TO EXCESS CALORIES: ICD-10-CM

## 2024-07-24 DIAGNOSIS — Z82.49 FAMILY HISTORY OF ISCHEMIC HEART DISEASE AND OTHER DISEASES OF THE CIRCULATORY SYSTEM: ICD-10-CM

## 2024-07-24 DIAGNOSIS — E78.1 PURE HYPERGLYCERIDEMIA: ICD-10-CM

## 2024-07-24 DIAGNOSIS — E78.00 PURE HYPERCHOLESTEROLEMIA, UNSPECIFIED: ICD-10-CM

## 2024-07-24 PROCEDURE — 99024 POSTOP FOLLOW-UP VISIT: CPT

## 2024-07-25 PROBLEM — Z98.84 S/P LAPAROSCOPIC SLEEVE GASTRECTOMY: Status: ACTIVE | Noted: 2024-07-25

## 2024-07-25 NOTE — ADDENDUM
[FreeTextEntry1] :  Documented by Toya Gillette acting as a scribe for ADAM Adams  on 07/24/2024  .

## 2024-07-25 NOTE — END OF VISIT
[Time Spent: ___ minutes] : I have spent [unfilled] minutes of time on the encounter. [FreeTextEntry3] :  All medical record entries made by the Scribe were at my, ADAM Aragon , direction and personally dictated by me on 07/24/2024 . I have reviewed the chart and agree that the record accurately reflects my personal performance of the history, physical exam, assessment and plan. I have also personally directed, reviewed, and agreed with the chart.

## 2024-07-25 NOTE — PHYSICAL EXAM
[Obese] : obese [Obese, well nourished, in no acute distress] : obese, well nourished, in no acute distress [Normal] : normoactive bowel sounds, soft and nontender, no hepatosplenomegaly or masses appreciated. Incisions healing appropriately without erythema or drainage [de-identified] : 5 healing lap sites with dermabond, no signs of infection

## 2024-07-25 NOTE — HISTORY OF PRESENT ILLNESS
[de-identified] : Artemio Schmidt is a 42 y/o M 2 weeks s/p VSG on 07/08/2024 who presents today for post op care. He is doing really well with no complains and denies n/v/c/d/ SOB, acid reflux, po intolerance, chest pain, abd pain, dizziness, fever and calf pain. Tolerating stage 2 diet with adequate protein and fluid intake. Advanced diet to blended foods and soups. Compliant with vitamins and medications and denies any difficulty with bowel movements.

## 2024-07-25 NOTE — ASSESSMENT
[FreeTextEntry1] : Artemio Schmidt is 42 y/o M having a stable post op course 2 weeks s/p VSG. Tolerating stage 2 diet well and compliant with vitamins. Will follow up with dietician to advance diet as per guidelines and advised to do physical activity but refrain from heavy lifting for one month.

## 2024-07-25 NOTE — ASSESSMENT
[FreeTextEntry1] : Artemio Schmidt is 44 y/o M having a stable post op course 2 weeks s/p VSG. Tolerating stage 2 diet well and compliant with vitamins. Will follow up with dietician to advance diet as per guidelines and advised to do physical activity but refrain from heavy lifting for one month.

## 2024-07-25 NOTE — HISTORY OF PRESENT ILLNESS
[de-identified] : Artemio Schmidt is a 42 y/o M 2 weeks s/p VSG on 07/08/2024 who presents today for post op care. He is doing really well with no complains and denies n/v/c/d/ SOB, acid reflux, po intolerance, chest pain, abd pain, dizziness, fever and calf pain. Tolerating stage 2 diet with adequate protein and fluid intake. Advanced diet to blended foods and soups. Compliant with vitamins and medications and denies any difficulty with bowel movements.

## 2024-07-25 NOTE — PHYSICAL EXAM
[Obese] : obese [Obese, well nourished, in no acute distress] : obese, well nourished, in no acute distress [Normal] : normoactive bowel sounds, soft and nontender, no hepatosplenomegaly or masses appreciated. Incisions healing appropriately without erythema or drainage [de-identified] : 5 healing lap sites with dermabond, no signs of infection

## 2024-09-13 ENCOUNTER — APPOINTMENT (OUTPATIENT)
Dept: SURGERY | Facility: CLINIC | Age: 44
End: 2024-09-13
Payer: MEDICAID

## 2024-09-13 VITALS
HEART RATE: 71 BPM | BODY MASS INDEX: 31.92 KG/M2 | OXYGEN SATURATION: 98 % | SYSTOLIC BLOOD PRESSURE: 117 MMHG | TEMPERATURE: 97.1 F | WEIGHT: 223 LBS | HEIGHT: 70 IN | DIASTOLIC BLOOD PRESSURE: 74 MMHG

## 2024-09-13 DIAGNOSIS — Z98.84 BARIATRIC SURGERY STATUS: ICD-10-CM

## 2024-09-13 PROCEDURE — 99024 POSTOP FOLLOW-UP VISIT: CPT

## 2024-09-13 NOTE — ASSESSMENT
[FreeTextEntry1] : Pt is a 44 y/o M 2 mo s/p VSG with  who presents today feeling well here for post op care. Pt states he received an automated message for an appt today and states he traveled over an hr for the appt and he was comfortable seeing me. Pt reports doing great overall and denies any n,v,c,d, acid reflux, or abd pn. Pt states he is tolerating his diet and is focusing on consuming adequate daily protein and fluids. States he has fully progressed to solid foods without difficulty. Pt states he is taking his vit most days of the week and daily compliance was encouraged. Pt reports doing some walking and we discussed the importance of daily physical activity and pt plans to start increasing his exercise throughout the week. Recommended incorporating strength training as well into his regimen. Pt has lost 34 lbs total since sx which he is very happy with. No other concerns at this time.

## 2024-09-13 NOTE — HISTORY OF PRESENT ILLNESS
[de-identified] : Pt is a 44 y/o M 2 mo s/p VSG with  who presents today feeling well here for post op care. Pt states he received an automated message for an appt today and states he traveled over an hr for the appt and he was comfortable seeing me. Pt reports doing great overall and denies any n,v,c,d, acid reflux, or abd pn. Pt states he is tolerating his diet and is focusing on consuming adequate daily protein and fluids. States he has fully progressed to solid foods without difficulty. Pt states he is taking his vit most days of the week and daily compliance was encouraged. Pt reports doing some walking and we discussed the importance of daily physical activity and pt plans to start increasing his exercise throughout the week. Recommended incorporating strength training as well into his regimen. Pt has lost 34 lbs total since sx which he is very happy with. No other concerns at this time.

## 2025-01-08 ENCOUNTER — APPOINTMENT (OUTPATIENT)
Dept: BARIATRICS | Facility: CLINIC | Age: 45
End: 2025-01-08
Payer: MEDICAID

## 2025-01-08 VITALS
TEMPERATURE: 97.2 F | OXYGEN SATURATION: 99 % | WEIGHT: 204 LBS | BODY MASS INDEX: 29.2 KG/M2 | SYSTOLIC BLOOD PRESSURE: 125 MMHG | HEART RATE: 58 BPM | HEIGHT: 70 IN | DIASTOLIC BLOOD PRESSURE: 83 MMHG

## 2025-01-08 DIAGNOSIS — Z98.84 BARIATRIC SURGERY STATUS: ICD-10-CM

## 2025-01-08 DIAGNOSIS — E66.01 MORBID (SEVERE) OBESITY DUE TO EXCESS CALORIES: ICD-10-CM

## 2025-01-08 PROCEDURE — 99214 OFFICE O/P EST MOD 30 MIN: CPT

## 2025-01-17 ENCOUNTER — NON-APPOINTMENT (OUTPATIENT)
Age: 45
End: 2025-01-17

## 2025-01-17 LAB
25(OH)D3 SERPL-MCNC: 28.7 NG/ML
A-TOCOPHEROL VIT E SERPL-MCNC: 11.5 MG/L
ALBUMIN SERPL ELPH-MCNC: 4.6 G/DL
ALP BLD-CCNC: 60 U/L
ALT SERPL-CCNC: 15 U/L
ANION GAP SERPL CALC-SCNC: 11 MMOL/L
AST SERPL-CCNC: 13 U/L
BASOPHILS # BLD AUTO: 0.05 K/UL
BASOPHILS NFR BLD AUTO: 1 %
BETA+GAMMA TOCOPHEROL SERPL-MCNC: 1.4 MG/L
BILIRUB SERPL-MCNC: 0.5 MG/DL
BUN SERPL-MCNC: 15 MG/DL
CALCIUM SERPL-MCNC: 10.7 MG/DL
CALCIUM SERPL-MCNC: 10.7 MG/DL
CHLORIDE SERPL-SCNC: 101 MMOL/L
CHOLEST SERPL-MCNC: 225 MG/DL
CO2 SERPL-SCNC: 28 MMOL/L
CREAT SERPL-MCNC: 0.87 MG/DL
EGFR: 108 ML/MIN/1.73M2
EOSINOPHIL # BLD AUTO: 0.06 K/UL
EOSINOPHIL NFR BLD AUTO: 1.2 %
ESTIMATED AVERAGE GLUCOSE: 114 MG/DL
FERRITIN SERPL-MCNC: 140 NG/ML
FOLATE SERPL-MCNC: 15.9 NG/ML
GLUCOSE SERPL-MCNC: 94 MG/DL
HBA1C MFR BLD HPLC: 5.6 %
HCT VFR BLD CALC: 48 %
HDLC SERPL-MCNC: 47 MG/DL
HGB BLD-MCNC: 16.2 G/DL
IMM GRANULOCYTES NFR BLD AUTO: 0.2 %
IRON SATN MFR SERPL: 20 %
IRON SERPL-MCNC: 59 UG/DL
LDLC SERPL CALC-MCNC: 164 MG/DL
LYMPHOCYTES # BLD AUTO: 1.79 K/UL
LYMPHOCYTES NFR BLD AUTO: 34.8 %
MAN DIFF?: NORMAL
MCHC RBC-ENTMCNC: 29.7 PG
MCHC RBC-ENTMCNC: 33.8 G/DL
MCV RBC AUTO: 87.9 FL
MONOCYTES # BLD AUTO: 0.55 K/UL
MONOCYTES NFR BLD AUTO: 10.7 %
NEUTROPHILS # BLD AUTO: 2.68 K/UL
NEUTROPHILS NFR BLD AUTO: 52.1 %
NONHDLC SERPL-MCNC: 178 MG/DL
PARATHYROID HORMONE INTACT: 34 PG/ML
PLATELET # BLD AUTO: 216 K/UL
POTASSIUM SERPL-SCNC: 5 MMOL/L
PREALB SERPL NEPH-MCNC: 27 MG/DL
PROT SERPL-MCNC: 7.3 G/DL
RBC # BLD: 5.46 M/UL
RBC # FLD: 13.4 %
SODIUM SERPL-SCNC: 141 MMOL/L
TIBC SERPL-MCNC: 297 UG/DL
TRIGL SERPL-MCNC: 80 MG/DL
TSH SERPL-ACNC: 2.02 UIU/ML
UIBC SERPL-MCNC: 238 UG/DL
VIT A SERPL-MCNC: 44 UG/DL
VIT B1 SERPL-MCNC: 116 NMOL/L
VIT B12 SERPL-MCNC: 551 PG/ML
VIT B6 SERPL-MCNC: 43 UG/L
WBC # FLD AUTO: 5.14 K/UL
ZINC SERPL-MCNC: 65 UG/DL

## 2025-04-30 ENCOUNTER — APPOINTMENT (OUTPATIENT)
Dept: BARIATRICS | Facility: CLINIC | Age: 45
End: 2025-04-30
Payer: MEDICAID

## 2025-04-30 VITALS — HEIGHT: 70 IN | WEIGHT: 200 LBS | BODY MASS INDEX: 28.63 KG/M2

## 2025-04-30 DIAGNOSIS — E66.01 MORBID (SEVERE) OBESITY DUE TO EXCESS CALORIES: ICD-10-CM

## 2025-04-30 DIAGNOSIS — Z98.84 BARIATRIC SURGERY STATUS: ICD-10-CM

## 2025-04-30 DIAGNOSIS — E78.9 DISORDER OF LIPOPROTEIN METABOLISM, UNSPECIFIED: ICD-10-CM

## 2025-04-30 DIAGNOSIS — E78.1 PURE HYPERGLYCERIDEMIA: ICD-10-CM

## 2025-04-30 PROCEDURE — 99214 OFFICE O/P EST MOD 30 MIN: CPT | Mod: 95

## (undated) DEVICE — Device

## (undated) DEVICE — PACK GENERAL LAPAROSCOPY

## (undated) DEVICE — SYR LUER LOK 30CC

## (undated) DEVICE — SUT PDS II 2-0 27" SH

## (undated) DEVICE — MARKING PEN W RULER

## (undated) DEVICE — STAPLER COVIDIEN ENDO GIA XL HANDLE

## (undated) DEVICE — GLV 6.5 PROTEXIS (WHITE)

## (undated) DEVICE — SUT PROLENE 2-0 36" SH

## (undated) DEVICE — DRAPE LEGGINGS XL

## (undated) DEVICE — TROCAR ETHICON ENDOPATH XCEL BLADELESS 5MM X 100MM STABILITY

## (undated) DEVICE — SUT MONOCRYL 4-0 27" PS-2 UNDYED

## (undated) DEVICE — SUT QUILL POLYPROPYLENE 1 15CM 22MM CLEAR

## (undated) DEVICE — VENODYNE/SCD SLEEVE CALF LARGE

## (undated) DEVICE — TIP METZENBAUM SCISSOR MONOPOLAR ENDOCUT (ORANGE)

## (undated) DEVICE — WARMING BLANKET UPPER ADULT

## (undated) DEVICE — POSITIONER SAGE MOBILITY TRANSFER PAD

## (undated) DEVICE — DRAPE 3/4 SHEET 52X76"

## (undated) DEVICE — SUT VICRYL PLUS 0 54" TIES

## (undated) DEVICE — TROCAR ETHICON ENDOPATH XCEL UNIVERSAL SLEEVE WITH OPTIVIEW 5MM X 100MM

## (undated) DEVICE — STAPLER ECHELON FLEX POWERED PLUS 440MM

## (undated) DEVICE — DRAPE 1/2 SHEET 40X57"

## (undated) DEVICE — TROCAR ETHICON ENDOPATH XCEL BLADELESS 15MM X 100MM STABILITY

## (undated) DEVICE — TUBING STRYKER PNEUMOCLEAR HIGH FLOW

## (undated) DEVICE — TROCAR ETHICON ENDOPATH XCEL UNIVERSAL SLEEVE 12MM X 100M STABILITY

## (undated) DEVICE — POSITIONER FOAM EGG CRATE ULNAR 2PCS (PINK)

## (undated) DEVICE — SUT PDO 2-0 1/2 CIRCLE 26MM NDL 15CM

## (undated) DEVICE — LIGASURE MARYLAND 37CM

## (undated) DEVICE — DRAPE TOWEL BLUE 17" X 24"

## (undated) DEVICE — TROCAR ETHICON ENDOPATH XCEL BLADELESS 12MM X 100MM STABILITY